# Patient Record
Sex: MALE | Race: WHITE
[De-identification: names, ages, dates, MRNs, and addresses within clinical notes are randomized per-mention and may not be internally consistent; named-entity substitution may affect disease eponyms.]

---

## 2019-01-27 ENCOUNTER — HOSPITAL ENCOUNTER (EMERGENCY)
Dept: HOSPITAL 80 - FED | Age: 44
Discharge: HOME | End: 2019-01-27
Payer: COMMERCIAL

## 2019-01-27 VITALS — SYSTOLIC BLOOD PRESSURE: 152 MMHG | DIASTOLIC BLOOD PRESSURE: 91 MMHG

## 2019-01-27 DIAGNOSIS — L03.114: Primary | ICD-10-CM

## 2019-01-27 NOTE — EDPHY
H & P


Time Seen by Provider: 01/27/19 14:15


HPI/ROS: 





CHIEF COMPLAINT:  Medical clearance for correction, chronic IV drug use





HISTORY OF PRESENT ILLNESS:  The patient presents to the ED for medical 

clearance for correction.  He has a history of chronic IV drug use.  The patient has 

2 ulcerated lesions on his left forearm prompting his visit to the ED.  The 

patient reports he has a history of abscesses in the past.  He denies any 

fever.  He denies significant pain in his upper lower extremities.  The patient 

does have some injection drug marks along his legs bilaterally also.  The 

patient takes no regular medications.  He does not have a primary care 

provider.  He denies additional acute complaints.





REVIEW OF SYSTEMS:


A comprehensive 10 point review of systems is otherwise negative aside from 

elements mentioned in the history of present illness.


Source: Patient





- Personal History


Tetanus Vaccine Date: <10 years





- Medical/Surgical History


Hx Asthma: No


Hx Chronic Respiratory Disease: No


Hx Diabetes: No


Hx Cardiac Disease: No


Hx Renal Disease: No


Hx Cirrhosis: No


Hx Alcoholism: No


Hx HIV/AIDS: No


Hx Splenectomy or Spleen Trauma: No


Other PMH: Chronic low back pain, kidney stone lithotripsy 7/15, IVDA.  PE





- Social History


Smoking Status: Current every day smoker





- Physical Exam


Exam: 





General Appearance:  Disheveled, no acute distress


Eyes:  Pupils equal and round no pallor or injection


ENT, Mouth:  Mucous membranes moist


Respiratory:  There are no retractions, lungs are clear to auscultation


Cardiovascular:  Regular rate and rhythm


Gastrointestinal:  Abdomen is soft and nontender, no masses, bowel sounds normal


Neurological:  5/5 strength noted all 4 extremities


Skin:  Warm and dry, no rashes


Musculoskeletal:  No clinical evidence of a deep space abscess, myositis or 

necrotizing fasciitis


Extremities:  Stigmata of old in recent IV drug use, 2 shallow ulcerations 

noted to the left forearm without significant fluctuance.


Psychiatric:  Patient is oriented X 3, there is no agitation


Allergies/Adverse Reactions: 


 





No Known Allergies Allergy (Unverified 11/17/16 22:20)


 








Home Medications: 














 Medication  Instructions  Recorded


 


Amphet Asp and D/Amphet [Adderall 10 mg PO BID@05,12 06/28/15





10 MG (*)]  


 


Omeprazole [Prilosec] 40 mg PO DAILY 06/28/15


 


QUEtiapine FUMARATE [Seroquel 50 50 mg PO HS 06/28/15





mg (*)]  


 


oxyCODONE IR [Oxycodone Ir (*)] 5 - 10 mg PO Q4 PRN #20 tab 07/09/15


 


Cephalexin [Keflex] 500 mg PO Q6H #28 cap 11/17/16


 


Sulfamethox/Tmp 800/160 mg 1 tab PO BID@1000,2200 #14 tab 11/17/16





[Bactrim Ds]  














Medical Decision Making


ED Course/Re-evaluation: 





Patient presents to the ED with multiple small ulcerations from IV drug use 

noted to the left forearm.  The patient has no fluctuance or abscess.  There is 

no clinical evidence of a septic arthritis or necrotizing fasciitis.





The patient will be discharged from the emergency department with a 

prescription for Bactrim.  He has been medically cleared for correction.





Patient is advised to return to the emergency department for high fever, 

increasing erythema, discharge or swelling.





Departure





- Departure


Disposition: Home, Routine, Self-Care


Clinical Impression: 


Cellulitis


Qualifiers:


 Site of cellulitis of extremity: upper extremity Laterality: left 





Condition: Good


Instructions:  Cellulitis (ED)


Additional Instructions: 


1. Please take antibiotics as directed.


2. Return to the ED for increasing pain, redness, swelling.


3. You have been given the contact number [facility] if you 

desire assistance with narcotic dependence.





  


Referrals: 


[facility] Hours [Outside] - As per Instructions